# Patient Record
Sex: MALE | Race: WHITE | HISPANIC OR LATINO | Employment: FULL TIME | URBAN - METROPOLITAN AREA
[De-identification: names, ages, dates, MRNs, and addresses within clinical notes are randomized per-mention and may not be internally consistent; named-entity substitution may affect disease eponyms.]

---

## 2017-07-28 ENCOUNTER — OFFICE VISIT (OUTPATIENT)
Dept: PHYSICAL MEDICINE AND REHAB | Facility: CLINIC | Age: 27
End: 2017-07-28
Payer: COMMERCIAL

## 2017-07-28 VITALS
HEART RATE: 63 BPM | HEIGHT: 73 IN | WEIGHT: 180 LBS | DIASTOLIC BLOOD PRESSURE: 72 MMHG | SYSTOLIC BLOOD PRESSURE: 117 MMHG | RESPIRATION RATE: 14 BRPM | BODY MASS INDEX: 23.86 KG/M2

## 2017-07-28 DIAGNOSIS — M53.3 SI (SACROILIAC) JOINT DYSFUNCTION: ICD-10-CM

## 2017-07-28 DIAGNOSIS — M47.26 OSTEOARTHRITIS OF SPINE WITH RADICULOPATHY, LUMBAR REGION: Primary | ICD-10-CM

## 2017-07-28 PROCEDURE — 99214 OFFICE O/P EST MOD 30 MIN: CPT | Mod: S$GLB,,, | Performed by: PHYSICAL MEDICINE & REHABILITATION

## 2017-07-28 PROCEDURE — 99999 PR PBB SHADOW E&M-EST. PATIENT-LVL III: CPT | Mod: PBBFAC,,, | Performed by: PHYSICAL MEDICINE & REHABILITATION

## 2017-07-28 RX ORDER — GABAPENTIN 300 MG/1
300 CAPSULE ORAL NIGHTLY
Qty: 30 CAPSULE | Refills: 1 | Status: SHIPPED | OUTPATIENT
Start: 2017-07-28 | End: 2017-10-13 | Stop reason: ALTCHOICE

## 2017-07-28 RX ORDER — METHYLPREDNISOLONE 4 MG/1
TABLET ORAL
Qty: 1 PACKAGE | Refills: 0 | Status: SHIPPED | OUTPATIENT
Start: 2017-07-28 | End: 2017-08-18

## 2017-07-28 NOTE — PATIENT INSTRUCTIONS
Anatomy of the Sacroiliac Joint  There are 2 sacroiliac (SI) joints. They are in the very low back (buttocks area). There is 1 joint on either side of the pelvis. The SI joints link the sacrum to the ilium. The sacrum is a triangular bone at the bottom of the spine. The ilium is part of the pelvis. The SI joints are held in place by ligaments. Ligaments are strong tissue that link bone to bone. The joints do not move very much, but they do help with mobility. They work with your spine and femurs to help you bend and walk. They also help bear the weight of your upper body.      Date Last Reviewed: 9/1/2015  © 1499-9443 [employer]. [address] [place] [address], [place] [address]. All rights reserved. This information is not intended as a substitute for professional medical care. Always follow your healthcare professional's instructions.        Degenerative Disk Disease    Spinal disks are gel-filled cushions between the bones, or vertebrae, of the spine. The disks act like shock absorbers. Over time, the disks may break down. This is called degenerative disk disease.  This condition can affect the neck or back. It is one of the most common causes of low back pain. It is the leading cause of disability in people under age 45 in the [place]. The pain often remains localized to the lower back or neck. Muscle spasm is often present and adds to the pain.  Disk degeneration is a natural part of aging. But it is not painful for most people. It may also occur as a result of repeated minor injuries due to daily activities, sports, or accidents. It may lead to osteoarthritis of the spine. Back pain related to disk disease may come and go. Or it may become chronic and last for months or years. The disk may bulge or rupture. This is called a slipped disk or herniated disk. That can put pressure on a nearby spinal nerve and cause neck or back pain that spreads down one arm or leg.  X-rays or an MRI may help to  diagnose this condition. For acute pain, treatment includes anti-inflammatory medicines, muscle relaxants, rest, ice, or heat. Strong prescription pain medicines, called opioids, may be needed for short-term treatment if pain suddenly gets worse. Opioid medicines can be addictive. So they are not advised for long-term pain management. Other types of medicines are preferred. Surgery is generally not used to treat this condition unless there is a complication.    Home care  · For neck pain: Use a comfortable pillow that supports the head and keeps the spine in a neutral position. Your head should not be tilted forward or backward.  · For back pain: Avoid sitting for long periods of time. This puts more stress on the lower back than standing or walking. Starting a regular exercise program to strengthen the supporting muscles of the spine will make it easier to live with degenerative disk disease.  · Apply an ice pack over the injured area for no more than 15 to 20 minutes. Do this every 3 to 6 hours for the first 24 to 48 hours. To make an ice pack, put ice cubes in a plastic bag that seals at the top. Wrap the bag in a clean, thin towel or cloth. Never put ice or an ice pack directly on the skin. Keep using ice packs to ease pain and swelling as needed. After 48 hours, apply heat (warm shower or warm bath) for 20 minutes several times a day, or switch between ice and heat.   · You may use over-the-counter pain medicine to control pain, unless another pain medicine was prescribed. If you have chronic liver or kidney disease or ever had a stomach ulcer or GI bleeding, talk with your provider before using these medicines.  Follow-up care  Follow up with your healthcare provider, or as directed.  If X-rays, a CT scan or an MRI were done, you will be notified of any new findings that may affect your care.  When to seek medical advice  Contact your healthcare provider right away if any of these occur:  · Increasing back  pain  · Your foot drags when you walk, a condition called foot drop  · You have new weakness, numbness, or pain in one or both arms or legs  · Loss of bowel or bladder control  · Numbness or tingling in the buttock or groin area  Date Last Reviewed: 11/23/2015  © 4531-9769 [employer]. [address] [place] [address], [place] [address]. All rights reserved. This information is not intended as a substitute for professional medical care. Always follow your healthcare professional's instructions.        Caring for Your Back Throughout the Day  Take care of your back throughout the day. You will likely have fewer back problems if you do. Try to warm up before you move. Shift positions often. Also do your best to form healthy habits.    Warm up for the day  Do a few slow, catlike stretches before starting your day. This simple warmup can soften your disks, stretch your back muscles, and help prevent injuries.  Shift positions often  At work and at home, change positions often. This helps keep your body from getting stiff. Stand up or lean back while you sit. If you can, get up and move every 1/2 hour.  Form healthy habits  Here are some suggestions:   · Keep a healthy weight. When you weigh too much, your back is under excess strain. But losing just a few extra pounds can help a lot.  · Try not to overeat. Learn about serving sizes. The size of a serving depends on the food and the food group. Many foods list serving sizes on the labels.  · Handle minor aches with cold and heat. Apply cold the first 24 to 48 hours. Use heat after that. Always place a thin cloth between your skin and the source of cold or heat.  · Take medicines as directed. This helps keep pain under control. Always read labels, and call your healthcare provider or pharmacist if you have any questions.  Walk each day  A daily walk keeps your back and thigh muscles stretched and strong. This gives your back better support. Be sure to walk with your  spines three curves aligned, by keeping your head, hips, and toes connected by a vertical line.   Date Last Reviewed: 10/18/2015  © 3792-4204 [employer], [employer]. [address] [place] [address], [place] [address]. All rights reserved. This information is not intended as a substitute for professional medical care. Always follow your healthcare professional's instructions.        Back Care Tips    Caring for your back  These are things you can do to prevent a recurrence of acute back pain and to reduce symptoms from chronic back pain:  · Maintain a healthy weight. If you are overweight, losing weight will help most types of back pain.  · Exercise is an important part of recovery from most types of back pain. The muscles behind and in front of the spine support the back. This means strengthening both the back muscles and the abdominal muscles will provide better support for your spine.   · Swimming and brisk walking are good overall exercises to improve your fitness level.  · Practice safe lifting methods (below).  · Practice good posture when sitting, standing and walking. Avoid prolonged sitting. This puts more stress on the lower back than standing or walking.  · Wear quality shoes with sufficient arch support. Foot and ankle alignment can affect back symptoms. Women should avoid wearing high heels.  · Therapeutic massage can help relax the back muscles without stretching them.  · During the first 24 to 72 hours after an acute injury or flare-up of chronic back pain, apply an ice pack to the painful area for 20 minutes and then remove it for 20 minutes, over a period of 60 to 90 minutes, or several times a day. As a safety precaution, do not use a heating pad at bedtime. Sleeping on a heating pad can lead to skin burns or tissue damage.  · You can alternate ice and heat therapies.  Medications  Talk to your healthcare provider before using medicines, especially if you have other medical problems or are taking other  medicines.  · You may use acetaminophen or ibuprofen to control pain, unless your healthcare provider prescribed other pain medicine. If you have chronic conditions like diabetes, liver or kidney disease, stomach ulcers, or gastrointestinal bleeding, or are taking blood thinners, talk with your healthcare provider before taking any medicines.  · Be careful if you are given prescription pain medicines, narcotics, or medicine for muscle spasm. They can cause drowsiness, affect your coordination, reflexes, and judgment. Do not drive or operate heavy machinery while taking these types of medicines. Take prescription pain medicine only as prescribed by your healthcare provider.  Lumbar stretch  Here is a simple stretching exercise that will help relax muscle spasm and keep your back more limber. If exercise makes your back pain worse, dont do it.  · Lie on your back with your knees bent and both feet on the ground.  · Slowly raise your left knee to your chest as you flatten your lower back against the floor. Hold for 5 seconds.  · Relax and repeat the exercise with your right knee.  · Do 10 of these exercises for each leg.  Safe lifting method  · Dont bend over at the waist to lift an object off the floor.  Instead, bend your knees and hips in a squat.   · Keep your back and head upright  · Hold the object close to your body, directly in front of you.  · Straighten your legs to lift the object.   · Lower the object to the floor in the reverse fashion.  · If you must slide something across the floor, push it.  Posture tips  Sitting  Sit in chairs with straight backs or low-back support. Keep your knees lower than your hips, with your feet flat on the floor.  When driving, sit up straight. Adjust the seat forward so you are not leaning toward the steering wheel.  A small pillow or rolled towel behind your lower back may help if you are driving long distances.   Standing  When standing for long periods, shift most of your  weight to one leg at a time. Alternate legs every few minutes.   Sleeping  The best way to sleep is on your side with your knees bent. Put a low pillow under your head to support your neck in a neutral spine position. Avoid thick pillows that bend your neck to one side. Put a pillow between your legs to further relax your lower back. If you sleep on your back, put pillows under your knees to support your legs in a slightly flexed position. Use a firm mattress. If your mattress sags, replace it, or use a 1/2-inch plywood board under the mattress to add support.  Follow-up care  Follow up with your healthcare provider, or as advised.  If X-rays, a CT scan or an MRI scan were taken, they will be reviewed by a radiologist. You will be notified of any new findings that may affect your care.  Call 911  Seek emergency medical care if any of the following occur:  · Trouble breathing  · Confusion  · Very drowsy  · Fainting or loss of consciousness  · Rapid or very slow heart rate  · Loss of  bowel or bladder control  When to seek medical care  Call your healthcare provider if any of the following occur:  · Pain becomes worse or spreads to your arms or legs  · Weakness or numbness in one or both arms or legs  · Numbness in the groin area  Date Last Reviewed: 6/1/2016  © 9708-7513 [employer]. [address] [place], [place] [id number]. All rights reserved. This information is not intended as a substitute for professional medical care. Always follow your healthcare professional's instructions.        Exercises to Strengthen Your Lower Back  Strong lower back and abdominal muscles work together to support your spine. The exercises below will help strengthen the lower back. It is important that you begin exercising slowly and increase levels gradually.  Always begin any exercise program with stretching. If you feel pain while doing any of these exercises, stop and talk to your doctor about a more specific exercise  program that better suits your condition.   Low back stretch  The point of stretching is to make you more flexible and increase your range of motion. Stretch only as much as you are able. Stretch slowly. Do not push your stretch to the limit. If at any point you feel pain while stretching, this is your (temporary) limit.  · Lie on your back with your knees bent and both feet on the ground.  · Slowly raise your left knee to your chest as you flatten your lower back against the floor. Hold for 5 seconds.  · Relax and repeat the exercise with your right knee.  · Do 10 of these exercises for each leg.  · Repeat hugging both knees to your chest at the same time.  Building lower back strength  Start your exercise routine with 10 to 30 minutes a day, 1 to 3 times a day.  Initial exercises  Lying on your back:  1. Ankle pumps: Move your foot up and down, towards your head, and then away. Repeat 10 times with each foot.  2. Heel slides: Slowly bend your knee, drawing the heel of your foot towards you. Then slide your heel/foot from you, straightening your knee. Do not lift your foot off the floor (this is not a leg lift).  3. Abdominal contraction: Bend your knees and put your hands on your stomach. Tighten your stomach muscles. Hold for 5 seconds, then relax. Repeat 10 times.  4. Straight leg raise: Bend one leg at the knee and keep the other leg straight. Tighten your stomach muscles. Slowly lift your straight leg 6 to 12 inches off the floor and hold for up to 5 seconds. Repeat 10 times on each side.  Standin. Wall squats: Stand with your back against the wall. Move your feet about 12 inches away from the wall. Tighten your stomach muscles, and slowly bend your knees until they are at about a 45 degree angle. Do not go down too far. Hold about 5 seconds. Then slowly return to your starting position. Repeat 10 times.  2. Heel raises: Stand facing the wall. Slowly raise the heels of your feet up and down, while keeping  your toes on the floor. If you have trouble balancing, you can touch the wall with your hands. Repeat 10 times.  More advanced exercises  When you feel comfortable enough, try these exercises.  1. Kneeling lumbar extension: Begin on your hands and knees. At the same time, raise and straighten your right arm and left leg until they are parallel to the ground. Hold for 2 seconds and come back slowly to a starting position. Repeat with left arm and right leg, alternating 10 times.  2. Prone lumbar extension: Lie face down, arms extended overhead, palms on the floor. At the same time, raise your right arm and left leg as high as comfortably possible. Hold for 10 seconds and slowly return to start. Repeat with left arm and right leg, alternating 10 times. Gradually build up to 20 times. (Advanced: Repeat this exercise raising both arms and both legs a few inches off the floor at the same time. Hold for 5 seconds and release.)  3. Pelvic tilt: Lie on the floor on your back with your knees bent at 90 degrees. Your feet should be flat on the floor. Inhale, exhale, then slowly contract your abdominal muscles bringing your navel toward your spine. Let your pelvis rock back until your lower back is flat on the floor. Hold for 10 seconds while breathing smoothly.  4. Abdominal crunch: Perform a pelvic tilt (above) flattening your lower back against the floor. Holding the tension in your abdominal muscles, take another breath and raise your shoulder blades off the ground (this is not a full sit-up). Keep your head in line with your body (dont bend your neck forward). Hold for 2 seconds, then slowly lower.  Date Last Reviewed: 6/1/2016 © 2000-2016 [employer]. [address] [place] [address]. All rights reserved. This information is not intended as a substitute for professional medical care. Always follow your healthcare professional's instructions.        Self-Care for Low Back Pain    Most people have  low back pain now and then. In many cases, it isnt serious and self-care can help. Sometimes low back pain can be a sign of a bigger problem. Call your healthcare provider if your pain returns often or gets worse over time. For the long-term care of your back, get regular exercise, lose any excess weight and learn good posture.  Take a short rest  Lying down during the day may be beneficial for short periods of time if severe pain increases with sitting or standing. Long-term bed rest could be detrimental.  Reduce pain and swelling  Cold reduces swelling. Both cold and heat can reduce pain. Protect your skin by placing a towel between your body and the ice or heat source.  · For the first few days, apply an ice pack for 15 to 20 minutes .  · After the first few days, try heat for 15 minutes at a time to ease pain. Never sleep on a heating pad.  · Over-the-counter medicine can help control pain and swelling. Try aspirin or ibuprofen.  Exercise  Exercise can help your back heal. It also helps your back get stronger and more flexible, preventing any reinjury. Ask your healthcare provider about specific exercises for your back.  Use good posture to avoid reinjury  · When moving, bend at the hips and knees. Dont bend at the waist or twist around.  · When lifting, keep the object close to your body. Dont try to lift more than you can handle.  · When sitting, keep your lower back supported. Use a rolled-up towel as needed.  Seek immediate medical care if:  · Youre unable to stand or walk.  · You have a temperature over 100.4°F (38.0°C)  · You have frequent, painful, or bloody urination.  · You have severe abdominal pain.  · You have a sharp, stabbing pain.  · Your pain is constant.  · You have pain or numbness in your leg.  · You feel pain in a new area of your back.  · You notice that the pain isnt decreasing after more than a week.   Date Last Reviewed: 9/29/2015  © 4999-9106 [employer]. [address] [place] [address]  [place] [address]. All rights reserved. This information is not intended as a substitute for professional medical care. Always follow your healthcare professional's instructions.        Back Safety: Sleeping Positions  Good posture protects your back when you sit, stand, and walk. It is also important while sleeping. Keep your ears, shoulders, and hips in line. Try the tips below. Also, be sure to follow any guidelines from your health care provider.  If you lie on your back  Safe sleeping     Place pillows under your legs from your thighs to your ankles.     Ask your healthcare provider how firm your mattress should be.  · Find a position that keeps your back aligned and comfortable.  · Fill gaps between your body and the mattress with pillows.  · Never sleep on your back without bending your legs.  · Never sleep on your stomach.  If you lie on your side  Turning in bed     Support your upper body and top leg with pillows.    · If you change positions, you will need to move your pillows. This can become so natural that you hardly wake up.  · When you turn in bed, move your whole body as one unit.  · Tighten your stomach muscles. Bend your knees slightly toward your chest.  · Roll to one side, keeping your ears, shoulders, and hips in line. Keep a pillow between your legs.  · Be careful not to bend or twist at the waist.  Date Last Reviewed: 8/31/2015  © 1892-9439 [employer]. [address] [place] [address]. All rights reserved. This information is not intended as a substitute for professional medical care. Always follow your healthcare professional's instructions.        Back Safety: Bending  Bending can strain or even injure your back. Follow the tips below to move safely and protect your back as you perform everyday activities.  Bending over    · Keep your feet shoulder-width apart.  · Move your whole body as one unit.  · Bend at your hips and knees, not at your  waist.  · Flatten your stomach and tighten your leg muscles.  · To keep your spine straight, let your buttocks move out behind you. Dont try to tuck them under.  · If you need to, place one hand on a sturdy object for support.     Bending to the floor  · Lower yourself to one knee. If you can, rest one hand on a sturdy object to help lower yourself.  · Rest one arm on your raised knee.  · Dont bend at the waist.  · Do not hunch your back or neck to reach to the floor. Instead, bend more at your hips and knees to get closer.  Date Last Reviewed: 8/31/2015  © 2541-1520 [employer]. [address] [place] [address]. All rights reserved. This information is not intended as a substitute for professional medical care. Always follow your healthcare professional's instructions.        Back Safety: Lifting  Lifting can strain or even injure your back. Follow these tips to keep your back safe while you bend, lift, and carry.      Protect Your Back While Lifting       Step 1:  · Face the object.  · With your back straight, get down on one knee.  · If you can, tilt the object so one side lifts off the ground.  · Keep the object close to you. Step 2:  · Tighten your stomach muscles.  · Use your legs, arms, and buttocks to lift, not your back.  · Avoid twisting.  · Lift the object to your knee.  · Grasp the object firmly. Step 3:  · Lift with your arms and legs, not your back.  · Move quickly to help make this easier.   To carry an object:  · Hold it close to your body.  · Bend your knees slightly as you walk. The heavier the object, the more you should bend your knees.  · Get help with heavy or unbalanced objects.  Date Last Reviewed: 8/31/2015 © 2000-2016 [employer]. [address] [place] [address]. All rights reserved. This information is not intended as a substitute for professional medical care. Always follow your healthcare professional's instructions.        Back  Exercises: Hip Rotator Stretch        To start, lie on your back with your knees bent and feet flat on the floor. Dont press your neck or lower back to the floor. Breathe deeply. You should feel comfortable and relaxed in this position.  · Rest your right ankle on your left knee.  · Place a towel behind your left thigh, and use it to pull the knee toward your chest. Feel the stretch in your buttocks.  · Hold for 30 to 60 seconds. Release.  · Repeat 2 times.  · Switch legs.   · If there is any pain other than stretch in the knee or buttock, stop and contact your healthcare provider.  For your safety, check with your healthcare provider before starting an exercise program.   Date Last Reviewed: 8/16/2015  © 9236-7554 [employer]. [address] [place] [address]. All rights reserved. This information is not intended as a substitute for professional medical care. Always follow your healthcare professional's instructions.        Back Exercises: Lower Back Stretch                        To start, sit in a chair with your feet flat on the floor. Shift your weight slightly forward. Relax, and keep your ears, shoulders, and hips aligned.  · Sit with your feet well apart.  · Bend forward and touch the floor with the backs of your hands. Relax and let your body drop.  · Hold for 20 seconds. Return to starting position.  · Repeat 2 times.   Date Last Reviewed: 8/16/2015  © 9922-3395 [employer]. [address] [place] [address]. All rights reserved. This information is not intended as a substitute for professional medical care. Always follow your healthcare professional's instructions.        Lumbar Stretch (Flexibility)    1. Lie on your back on the floor, with your knees bent and your feet flat on the floor. Dont press your neck or lower back to the floor.  2. Pull one knee up toward your chest. Clasp your hands under your thigh to help pull.  3. Hold for 30 to 60 seconds.  Lower your leg back down to the floor.  4. Repeat 2 times, or as instructed.  5. Switch legs and repeat.  Date Last Reviewed: 3/10/2016  © 6148-4109 [employer]. [address] [place] [address], [place] [address]. All rights reserved. This information is not intended as a substitute for professional medical care. Always follow your healthcare professional's instructions.

## 2017-07-28 NOTE — PROGRESS NOTES
PM&R CLINIC NOTE    Chief Complaint   Patient presents with    Back Pain       HPI: This is a 26 y.o.  male being seen in clinic today for follow up of low back and hip pain that has returned around March.  In April he went on vacation to [place]-lots of walking. He experienced a lot of pain his feet while in [place] with transitioning to tingling into his gluteus and post thighs to the heel. With standing he has increased pain and pressure to his heels. He feels weakness in his legs as well.  He continues chiropractic care, but hasn't been having long lasting relief over the past 3 visits    History obtained from patient    Review of Systems:     General- denies lethargy, weight change, fever, chills  Head/neck- denies swallowing difficulties  ENT- denies hearing changes  Cardiovascular-denies chest pain  Pulmonary- denies shortness of breath  GI- denies constipation or bowel incontinence  - denies bladder incontinence  Skin- denies wounds or rashes  Musculoskeletal- denies weakness, +pain  Neurologic- denies numbness and tingling  Psychiatric- denies depressive or psychotic features, denies anxiety  Lymphatic-denies swelling  Endocrine- denies hypoglycemic symptoms/DM history    Physical Examination:  General: Well developed, well nourished male, NAD    Spinal Examination: CERVICAL  Active ROM is within normal limits.  Inspection: No deformity of spinal alignment.    Spinal Examination: LUMBAR or THORACIC  Active ROM is within normal limits-mild limitation in flex  Inspection: No deformity of spinal alignment.  No palpable olisthesis.  Palpation: mild tightness along paraspinals   +mild facet loading bilaterally-worse on left    Bilateral Upper and Lower Extremities:  Shoulder/Elbow/Wrist/Hand ROM wnl  Hip/Knee/Ankle ROM wnl, mild weakness in hip muscles (abduction)  Bilateral Extremities show normal capillary refill.  No signs of cyanosis, rubor, edema, skin changes, or dysvascular changes of appendages.  Nails  appear intact.    Neurological Exam:  Cranial Nerves:  II-XII grossly intact    Manual Muscle Testing: (Motor 5=normal)  RIGHT Lower extremity: Hip flexion 5/5, Hip Abduction 4/5, Knee extension 5/5, Knee flexion 5/5, Ankle dorsiflexion 5/5, Extensor hallucis longus 5/5, Ankle plantarflexion 5/5  LEFT Lower extremity:  Hip flexion 5/5, Hip Abduction 4/5, Knee extension 5/5, Knee flexion 5/5, Ankle dorsiflexion 5/5, Extensor hallucis longus 5/5, Ankle plantarflexion 5/5    Able to stand on heels and toes  No focal atrophy is noted of either upper or lower extremity.  Gait: Narrow base and good arm swing.    IMPRESSION/PLAN: This is a 26 y.o.  male with lumbar DJD/DDD with leg radiculitis, weakness in hip stabilizers     1. Ice/heat modalities 20 min. Reviewed MRI again with patient, all questions answered  2. Rx for [id number]--Core and hip strength, pelvic stability,traction, ROM, modalities  3. Medrol dose pack and gabapentin 300mg QHS  4. If not improving, will refer for YAN jaylan Lizarraga M.D.  [facility]

## 2017-10-13 ENCOUNTER — OFFICE VISIT (OUTPATIENT)
Dept: INTERNAL MEDICINE | Facility: CLINIC | Age: 27
End: 2017-10-13

## 2017-10-13 VITALS
OXYGEN SATURATION: 98 % | TEMPERATURE: 99 F | SYSTOLIC BLOOD PRESSURE: 102 MMHG | HEART RATE: 50 BPM | BODY MASS INDEX: 25.14 KG/M2 | RESPIRATION RATE: 16 BRPM | WEIGHT: 189.69 LBS | DIASTOLIC BLOOD PRESSURE: 70 MMHG | HEIGHT: 73 IN

## 2017-10-13 DIAGNOSIS — S01.511D LIP LACERATION, SUBSEQUENT ENCOUNTER: Primary | ICD-10-CM

## 2017-10-13 PROCEDURE — 99213 OFFICE O/P EST LOW 20 MIN: CPT | Mod: PBBFAC,PO | Performed by: PHYSICIAN ASSISTANT

## 2017-10-13 PROCEDURE — 99213 OFFICE O/P EST LOW 20 MIN: CPT | Mod: S$PBB,,, | Performed by: PHYSICIAN ASSISTANT

## 2017-10-13 PROCEDURE — 99999 PR PBB SHADOW E&M-EST. PATIENT-LVL III: CPT | Mod: PBBFAC,,, | Performed by: PHYSICIAN ASSISTANT

## 2017-10-13 RX ORDER — IBUPROFEN 200 MG
200 TABLET ORAL EVERY 6 HOURS PRN
COMMUNITY
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
